# Patient Record
Sex: FEMALE | Race: WHITE | NOT HISPANIC OR LATINO | Employment: FULL TIME | URBAN - METROPOLITAN AREA
[De-identification: names, ages, dates, MRNs, and addresses within clinical notes are randomized per-mention and may not be internally consistent; named-entity substitution may affect disease eponyms.]

---

## 2017-10-11 ENCOUNTER — CLINICAL SUPPORT (OUTPATIENT)
Dept: OTHER | Facility: CLINIC | Age: 47
End: 2017-10-11
Payer: COMMERCIAL

## 2017-10-11 DIAGNOSIS — Z00.8 HEALTH EXAMINATION IN POPULATION SURVEYS: Primary | ICD-10-CM

## 2017-10-11 PROCEDURE — 82947 ASSAY GLUCOSE BLOOD QUANT: CPT | Mod: QW,S$GLB,, | Performed by: INTERNAL MEDICINE

## 2017-10-11 PROCEDURE — 80061 LIPID PANEL: CPT | Mod: QW,S$GLB,, | Performed by: INTERNAL MEDICINE

## 2017-10-11 PROCEDURE — 83036 HEMOGLOBIN GLYCOSYLATED A1C: CPT | Mod: QW,S$GLB,, | Performed by: INTERNAL MEDICINE

## 2017-10-11 PROCEDURE — 99401 PREV MED CNSL INDIV APPRX 15: CPT | Mod: S$GLB,,, | Performed by: INTERNAL MEDICINE

## 2017-10-12 VITALS
WEIGHT: 236 LBS | BODY MASS INDEX: 43.43 KG/M2 | DIASTOLIC BLOOD PRESSURE: 84 MMHG | SYSTOLIC BLOOD PRESSURE: 136 MMHG | HEIGHT: 62 IN

## 2017-10-12 LAB
GLUCOSE SERPL-MCNC: 139 MG/DL (ref 60–140)
HBA1C MFR BLD: 6.8 % (ref 0–5.7)
POC CHOLESTEROL, HDL: 44 MG/DL (ref 40–?)
POC CHOLESTEROL, LDL: 91 MG/DL (ref ?–160)
POC CHOLESTEROL, TOTAL: 182 MG/DL (ref ?–240)
POC GLUCOSE FASTING: ABNORMAL MG/DL (ref 60–110)
POC TOTAL CHOLESTEROL / HDL RATIO: 4.1 (ref ?–6)
POC TRIGLYCERIDES: 235 MG/DL (ref ?–160)

## 2018-10-29 ENCOUNTER — CLINICAL SUPPORT (OUTPATIENT)
Dept: OTHER | Facility: CLINIC | Age: 48
End: 2018-10-29
Payer: COMMERCIAL

## 2018-10-29 DIAGNOSIS — Z00.8 ENCOUNTER FOR OTHER GENERAL EXAMINATION: ICD-10-CM

## 2018-10-29 PROCEDURE — 80061 LIPID PANEL: CPT | Mod: QW,S$GLB,, | Performed by: INTERNAL MEDICINE

## 2018-10-29 PROCEDURE — 82947 ASSAY GLUCOSE BLOOD QUANT: CPT | Mod: QW,S$GLB,, | Performed by: INTERNAL MEDICINE

## 2018-10-29 PROCEDURE — 99401 PREV MED CNSL INDIV APPRX 15: CPT | Mod: S$GLB,,, | Performed by: INTERNAL MEDICINE

## 2018-10-30 VITALS — HEIGHT: 61 IN | BODY MASS INDEX: 44.59 KG/M2

## 2018-10-30 LAB
HBA1C MFR BLD: 7.1 %
HDLC SERPL-MCNC: 48 MG/DL
POC CHOLESTEROL, LDL (DOCK): 87 MG/DL
POC CHOLESTEROL, TOTAL: 166 MG/DL
POC GLUCOSE, FASTING: 139 MG/DL
TRIGL SERPL-MCNC: 154 MG/DL

## 2018-11-16 NOTE — PROGRESS NOTES
Results reviewed per DELMIS Parker RN at [facility]. BP med taken 12 hours ago. Manual reading 150/92. Pt states she is having pain today.

## 2019-10-30 ENCOUNTER — CLINICAL SUPPORT (OUTPATIENT)
Dept: OTHER | Facility: CLINIC | Age: 49
End: 2019-10-30
Payer: COMMERCIAL

## 2019-10-30 DIAGNOSIS — Z00.8 ENCOUNTER FOR OTHER GENERAL EXAMINATION: ICD-10-CM

## 2019-10-30 PROCEDURE — 80061 PR  LIPID PANEL: ICD-10-PCS | Mod: QW,S$GLB,, | Performed by: INTERNAL MEDICINE

## 2019-10-30 PROCEDURE — 99401 PREV MED CNSL INDIV APPRX 15: CPT | Mod: S$GLB,,, | Performed by: INTERNAL MEDICINE

## 2019-10-30 PROCEDURE — 99401 PR PREVENT COUNSEL,INDIV,15 MIN: ICD-10-PCS | Mod: S$GLB,,, | Performed by: INTERNAL MEDICINE

## 2019-10-30 PROCEDURE — 82947 ASSAY GLUCOSE BLOOD QUANT: CPT | Mod: QW,S$GLB,, | Performed by: INTERNAL MEDICINE

## 2019-10-30 PROCEDURE — 80061 LIPID PANEL: CPT | Mod: QW,S$GLB,, | Performed by: INTERNAL MEDICINE

## 2019-10-30 PROCEDURE — 82947 PR  ASSAY QUANTITATIVE,BLOOD GLUCOSE: ICD-10-PCS | Mod: QW,S$GLB,, | Performed by: INTERNAL MEDICINE

## 2019-10-31 VITALS — BODY MASS INDEX: 44.59 KG/M2 | HEIGHT: 61 IN

## 2019-10-31 LAB
GLUCOSE SERPL-MCNC: 146 MG/DL (ref 60–140)
HBA1C MFR BLD: 7.1 %
HDLC SERPL-MCNC: 38 MG/DL
POC CHOLESTEROL, LDL (DOCK): 101 MG/DL
POC CHOLESTEROL, TOTAL: 187 MG/DL
TRIGL SERPL-MCNC: 238 MG/DL

## 2020-11-04 ENCOUNTER — CLINICAL SUPPORT (OUTPATIENT)
Dept: OTHER | Facility: CLINIC | Age: 50
End: 2020-11-04
Payer: COMMERCIAL

## 2020-11-04 DIAGNOSIS — Z00.8 ENCOUNTER FOR OTHER GENERAL EXAMINATION: ICD-10-CM

## 2020-11-04 PROCEDURE — 80061 LIPID PANEL: CPT | Mod: QW,S$GLB,, | Performed by: INTERNAL MEDICINE

## 2020-11-04 PROCEDURE — 99401 PR PREVENT COUNSEL,INDIV,15 MIN: ICD-10-PCS | Mod: S$GLB,,, | Performed by: INTERNAL MEDICINE

## 2020-11-04 PROCEDURE — 80061 PR  LIPID PANEL: ICD-10-PCS | Mod: QW,S$GLB,, | Performed by: INTERNAL MEDICINE

## 2020-11-04 PROCEDURE — 82947 PR  ASSAY QUANTITATIVE,BLOOD GLUCOSE: ICD-10-PCS | Mod: QW,S$GLB,, | Performed by: INTERNAL MEDICINE

## 2020-11-04 PROCEDURE — 82947 ASSAY GLUCOSE BLOOD QUANT: CPT | Mod: QW,S$GLB,, | Performed by: INTERNAL MEDICINE

## 2020-11-04 PROCEDURE — 99401 PREV MED CNSL INDIV APPRX 15: CPT | Mod: S$GLB,,, | Performed by: INTERNAL MEDICINE

## 2020-11-05 VITALS — BODY MASS INDEX: 43.16 KG/M2 | HEIGHT: 62 IN

## 2020-11-05 LAB
HBA1C MFR BLD: 5.3 %
HDLC SERPL-MCNC: 69 MG/DL
POC CHOLESTEROL, LDL (DOCK): 102 MG/DL
POC CHOLESTEROL, TOTAL: 207 MG/DL
POC GLUCOSE, FASTING: 71 MG/DL (ref 60–110)
TRIGL SERPL-MCNC: 183 MG/DL

## 2021-01-14 ENCOUNTER — TELEPHONE (OUTPATIENT)
Dept: OTHER | Facility: CLINIC | Age: 51
End: 2021-01-14

## 2021-04-29 ENCOUNTER — PATIENT MESSAGE (OUTPATIENT)
Dept: RESEARCH | Facility: HOSPITAL | Age: 51
End: 2021-04-29

## 2021-10-21 ENCOUNTER — CLINICAL SUPPORT (OUTPATIENT)
Dept: OTHER | Facility: CLINIC | Age: 51
End: 2021-10-21
Payer: COMMERCIAL

## 2021-10-21 DIAGNOSIS — Z00.8 ENCOUNTER FOR OTHER GENERAL EXAMINATION: ICD-10-CM

## 2021-10-21 PROCEDURE — 80061 LIPID PANEL: CPT | Mod: QW,S$GLB,, | Performed by: INTERNAL MEDICINE

## 2021-10-21 PROCEDURE — 80061 PR  LIPID PANEL: ICD-10-PCS | Mod: QW,S$GLB,, | Performed by: INTERNAL MEDICINE

## 2021-10-21 PROCEDURE — 82947 PR  ASSAY QUANTITATIVE,BLOOD GLUCOSE: ICD-10-PCS | Mod: QW,S$GLB,, | Performed by: INTERNAL MEDICINE

## 2021-10-21 PROCEDURE — 99401 PR PREVENT COUNSEL,INDIV,15 MIN: ICD-10-PCS | Mod: S$GLB,,, | Performed by: INTERNAL MEDICINE

## 2021-10-21 PROCEDURE — 82947 ASSAY GLUCOSE BLOOD QUANT: CPT | Mod: QW,S$GLB,, | Performed by: INTERNAL MEDICINE

## 2021-10-21 PROCEDURE — 99401 PREV MED CNSL INDIV APPRX 15: CPT | Mod: S$GLB,,, | Performed by: INTERNAL MEDICINE

## 2021-10-22 VITALS — BODY MASS INDEX: 44.59 KG/M2 | HEIGHT: 61 IN

## 2021-10-22 LAB
HDLC SERPL-MCNC: 55 MG/DL
POC CHOLESTEROL, LDL (DOCK): 91 MG/DL
POC CHOLESTEROL, TOTAL: 171 MG/DL
POC GLUCOSE, FASTING: 84 MG/DL (ref 60–110)
TRIGL SERPL-MCNC: 129 MG/DL

## 2025-05-25 ENCOUNTER — HOSPITAL ENCOUNTER (EMERGENCY)
Facility: HOSPITAL | Age: 55
Discharge: HOME | End: 2025-05-25
Attending: EMERGENCY MEDICINE
Payer: COMMERCIAL

## 2025-05-25 VITALS
HEART RATE: 82 BPM | SYSTOLIC BLOOD PRESSURE: 133 MMHG | DIASTOLIC BLOOD PRESSURE: 91 MMHG | TEMPERATURE: 98 F | OXYGEN SATURATION: 100 % | BODY MASS INDEX: 28.56 KG/M2 | HEIGHT: 61 IN | WEIGHT: 151.25 LBS | RESPIRATION RATE: 19 BRPM

## 2025-05-25 DIAGNOSIS — K57.90 DIVERTICULOSIS: ICD-10-CM

## 2025-05-25 DIAGNOSIS — K59.00 CONSTIPATION, UNSPECIFIED CONSTIPATION TYPE: ICD-10-CM

## 2025-05-25 DIAGNOSIS — R11.2 NAUSEA AND VOMITING, UNSPECIFIED VOMITING TYPE: Primary | ICD-10-CM

## 2025-05-25 LAB
ABSOLUTE EOSINOPHIL (OHS): 0.2 K/UL
ABSOLUTE MONOCYTE (OHS): 0.44 K/UL (ref 0.3–1)
ABSOLUTE NEUTROPHIL COUNT (OHS): 4.89 K/UL (ref 1.8–7.7)
ALBUMIN SERPL BCP-MCNC: 4.1 G/DL (ref 3.5–5.2)
ALP SERPL-CCNC: 64 UNIT/L (ref 40–150)
ALT SERPL W/O P-5'-P-CCNC: 12 UNIT/L (ref 10–44)
ANION GAP (OHS): 14 MMOL/L (ref 8–16)
AST SERPL-CCNC: 14 UNIT/L (ref 11–45)
BASOPHILS # BLD AUTO: 0.06 K/UL
BASOPHILS NFR BLD AUTO: 0.7 %
BILIRUB SERPL-MCNC: 0.5 MG/DL (ref 0.1–1)
BILIRUB UR QL STRIP.AUTO: NEGATIVE
BUN SERPL-MCNC: 8 MG/DL (ref 6–20)
CALCIUM SERPL-MCNC: 9.9 MG/DL (ref 8.7–10.5)
CHLORIDE SERPL-SCNC: 103 MMOL/L (ref 95–110)
CLARITY UR: CLEAR
CO2 SERPL-SCNC: 23 MMOL/L (ref 23–29)
COLOR UR AUTO: YELLOW
CREAT SERPL-MCNC: 0.7 MG/DL (ref 0.5–1.4)
ERYTHROCYTE [DISTWIDTH] IN BLOOD BY AUTOMATED COUNT: 12.4 % (ref 11.5–14.5)
GFR SERPLBLD CREATININE-BSD FMLA CKD-EPI: >60 ML/MIN/1.73/M2
GLUCOSE SERPL-MCNC: 87 MG/DL (ref 70–110)
GLUCOSE UR QL STRIP: NEGATIVE
HCT VFR BLD AUTO: 42.6 % (ref 37–48.5)
HGB BLD-MCNC: 14.1 GM/DL (ref 12–16)
HGB UR QL STRIP: NEGATIVE
HOLD SPECIMEN: NORMAL
IMM GRANULOCYTES # BLD AUTO: 0.02 K/UL (ref 0–0.04)
IMM GRANULOCYTES NFR BLD AUTO: 0.2 % (ref 0–0.5)
KETONES UR QL STRIP: NEGATIVE
LEUKOCYTE ESTERASE UR QL STRIP: NEGATIVE
LIPASE SERPL-CCNC: 37 U/L (ref 4–60)
LYMPHOCYTES # BLD AUTO: 2.49 K/UL (ref 1–4.8)
MCH RBC QN AUTO: 29.3 PG (ref 27–31)
MCHC RBC AUTO-ENTMCNC: 33.1 G/DL (ref 32–36)
MCV RBC AUTO: 88 FL (ref 82–98)
NITRITE UR QL STRIP: NEGATIVE
NUCLEATED RBC (/100WBC) (OHS): 0 /100 WBC
PH UR STRIP: 8 [PH]
PLATELET # BLD AUTO: 255 K/UL (ref 150–450)
PMV BLD AUTO: 10.4 FL (ref 9.2–12.9)
POTASSIUM SERPL-SCNC: 4.1 MMOL/L (ref 3.5–5.1)
PROT SERPL-MCNC: 7.3 GM/DL (ref 6–8.4)
PROT UR QL STRIP: NEGATIVE
RBC # BLD AUTO: 4.82 M/UL (ref 4–5.4)
RELATIVE EOSINOPHIL (OHS): 2.5 %
RELATIVE LYMPHOCYTE (OHS): 30.7 % (ref 18–48)
RELATIVE MONOCYTE (OHS): 5.4 % (ref 4–15)
RELATIVE NEUTROPHIL (OHS): 60.5 % (ref 38–73)
SODIUM SERPL-SCNC: 140 MMOL/L (ref 136–145)
SP GR UR STRIP: 1.01
UROBILINOGEN UR STRIP-ACNC: NEGATIVE EU/DL
WBC # BLD AUTO: 8.1 K/UL (ref 3.9–12.7)

## 2025-05-25 PROCEDURE — 80053 COMPREHEN METABOLIC PANEL: CPT | Mod: ER

## 2025-05-25 PROCEDURE — 81003 URINALYSIS AUTO W/O SCOPE: CPT | Mod: ER

## 2025-05-25 PROCEDURE — 96374 THER/PROPH/DIAG INJ IV PUSH: CPT | Mod: ER

## 2025-05-25 PROCEDURE — 99285 EMERGENCY DEPT VISIT HI MDM: CPT | Mod: 25,ER

## 2025-05-25 PROCEDURE — 83690 ASSAY OF LIPASE: CPT | Mod: ER

## 2025-05-25 PROCEDURE — 85025 COMPLETE CBC W/AUTO DIFF WBC: CPT | Mod: ER

## 2025-05-25 PROCEDURE — 63600175 PHARM REV CODE 636 W HCPCS: Mod: ER

## 2025-05-25 RX ORDER — METOCLOPRAMIDE HYDROCHLORIDE 5 MG/ML
10 INJECTION INTRAMUSCULAR; INTRAVENOUS
Status: COMPLETED | OUTPATIENT
Start: 2025-05-25 | End: 2025-05-25

## 2025-05-25 RX ORDER — ONDANSETRON 4 MG/1
4 TABLET, FILM COATED ORAL EVERY 6 HOURS PRN
Qty: 12 TABLET | Refills: 0 | Status: SHIPPED | OUTPATIENT
Start: 2025-05-25 | End: 2025-05-29

## 2025-05-25 RX ADMIN — METOCLOPRAMIDE 10 MG: 5 INJECTION, SOLUTION INTRAMUSCULAR; INTRAVENOUS at 12:05

## 2025-05-25 NOTE — ED PROVIDER NOTES
ED Provider Note - 5/25/2025    History     Chief Complaint   Patient presents with    Abdominal Pain     That began on Tuesday. +N/V/D     Patient with a previous medical history of asthma, diabetes, endometriosis, hypercholesterolemia, thyroid nodule presents to emergency department with complains of abdominal pain. The pain is described as cramping and pressure-like, and is 4/10 in intensity. Pain is located in the epigastric, diffusely without radiation. Onset was 2 days ago. Symptoms have been unchanged since. Aggravating factors: alcohol, eating, and spicy foods.  Alleviating factors: none. Associated symptoms: constipation, nausea, and vomiting. The patient denies diarrhea, fever, hematuria, melena, and vaginal discharge.  Patient reports her last bowel movement was today.  Patient reports no recent medication changes and that she has been taking majoro for 2 years.            Review of patient's allergies indicates:   Allergen Reactions    Codeine Hives     halluciations    Lortab [hydrocodone-acetaminophen]      Past Medical History:   Diagnosis Date    Asthma     Diabetes     Endometriosis     Hypercholesterolemia     Thyroid nodule      Past Surgical History:   Procedure Laterality Date    COLONOSCOPY  12/21/2023    HYSTEROSCOPY      LAPAROSCOPIC CHOLECYSTECTOMY  09/30/2020    RIGHT OOPHORECTOMY      SLEEVE GASTROPLASTY      TONSILLECTOMY      TUBAL LIGATION      WISDOM TOOTH EXTRACTION       Family History   Problem Relation Name Age of Onset    Lung cancer Mother          mets to breast and bone    Thyroid cancer Daughter      Depression Maternal Grandmother      Diabetes Maternal Grandmother      Heart disease Paternal Grandmother      Heart disease Paternal Grandfather       Social History[1]     Review of Systems   Constitutional:  Negative for chills and fever.   HENT:  Negative for sore throat.    Respiratory:  Negative for shortness of breath.    Cardiovascular:  Negative for chest pain.  "  Gastrointestinal:  Positive for abdominal pain, constipation, nausea and vomiting. Negative for diarrhea.   Genitourinary:  Negative for dysuria, flank pain, pelvic pain, vaginal bleeding and vaginal discharge.   Musculoskeletal:  Negative for back pain, neck pain and neck stiffness.   Skin:  Negative for rash.   Neurological:  Negative for dizziness, weakness, light-headedness and numbness.   Hematological:  Does not bruise/bleed easily.     The patient's list of active medical problems, social history, medications, and allergies as documented per RN staff has been reviewed.     Physical Exam     Vitals:    05/25/25 1108   BP: (!) 133/91   Pulse: 82   Resp: 19   Temp: 98.1 °F (36.7 °C)   TempSrc: Oral   SpO2: 100%   Weight: 68.6 kg (151 lb 3.8 oz)   Height: 5' 1" (1.549 m)     Physical Exam    Constitutional: She appears well-developed. She is cooperative. No distress.   HENT:   Head: Normocephalic and atraumatic.   Eyes: EOM are normal. Pupils are equal, round, and reactive to light.   Neck: Neck supple.   Normal range of motion.  Cardiovascular:  Normal rate, regular rhythm, normal heart sounds and intact distal pulses.           Pulmonary/Chest: Breath sounds normal. No respiratory distress.   Abdominal: Abdomen is soft. Bowel sounds are normal. She exhibits no distension. There is no abdominal tenderness.   No right CVA tenderness.  No left CVA tenderness. There is no rebound and no guarding.   Musculoskeletal:         General: No edema. Normal range of motion.      Cervical back: Normal range of motion and neck supple.     Neurological: She is alert and oriented to person, place, and time. GCS score is 15. GCS eye subscore is 4. GCS verbal subscore is 5. GCS motor subscore is 6.   Skin: Skin is warm. Capillary refill takes less than 2 seconds.   Psychiatric: She has a normal mood and affect.       ED Procedures   Procedures    MDM  Differential Diagnoses   Based on available history, the working differential " diagnoses considered during this evaluation include but are not limited to viral gastroenteritis, food poisoning, intra-abdominal infection, enteritis/colitis, constipation, impaction, ileus, obstruction, pancreatitis, PUD, gastritis, GERD.      LABS     Labs Reviewed   COMPREHENSIVE METABOLIC PANEL - Normal       Result Value    Sodium 140      Potassium 4.1      Chloride 103      CO2 23      Glucose 87      BUN 8      Creatinine 0.7      Calcium 9.9      Protein Total 7.3      Albumin 4.1      Bilirubin Total 0.5      ALP 64      AST 14      ALT 12      Anion Gap 14      eGFR >60     LIPASE - Normal    Lipase Level 37     URINALYSIS, REFLEX TO URINE CULTURE - Normal    Color, UA Yellow      Appearance, UA Clear      pH, UA 8.0      Spec Grav UA 1.015      Protein, UA Negative      Glucose, UA Negative      Ketones, UA Negative      Bilirubin, UA Negative      Blood, UA Negative      Nitrites, UA Negative      Urobilinogen, UA Negative      Leukocyte Esterase, UA Negative     CBC WITH DIFFERENTIAL - Normal    WBC 8.10      RBC 4.82      HGB 14.1      HCT 42.6      MCV 88      MCH 29.3      MCHC 33.1      RDW 12.4      Platelet Count 255      MPV 10.4      Nucleated RBC 0      Neut % 60.5      Lymph % 30.7      Mono % 5.4      Eos % 2.5      Basophil % 0.7      Imm Grans % 0.2      Neut # 4.89      Lymph # 2.49      Mono # 0.44      Eos # 0.20      Baso # 0.06      Imm Grans # 0.02     CBC W/ AUTO DIFFERENTIAL    Narrative:     The following orders were created for panel order CBC W/ AUTO DIFFERENTIAL.  Procedure                               Abnormality         Status                     ---------                               -----------         ------                     CBC with Differential[[id number]]       Normal              Final result                 Please view results for these tests on the individual orders.   GREY TOP URINE HOLD    Extra Tube Hold for add-ons.             Notable findings from my  independent interpretations of the labs (if ordered) include:  Lipase unremarkable, CBC unremarkable, Chemistry unremarkable, Urinalysis urinalysis unremarkable         Imaging     Imaging Results              CT Abdomen Pelvis  Without Contrast (Final result)  Result time 05/25/25 12:53:29      Final result by Deniz Lugo MD (05/25/25 12:53:29)                   Impression:      1.  There is no CT evidence of an acute intra-abdominal inflammatory process.  2.  Cholecystectomy with suspected reservoir effect with prominent dilatation common bile duct, 15 mm, with tapering at the ampulla.  No intrahepatic ductal dilatation.  Normal pancreatic duct.  3.  Status post gastric banding without complication.  4.  Mild sigmoid diverticulosis.          All CT scans at [this location] are performed using dose modulation techniques as appropriate to a performed exam including the following:  Automated exposure control; adjustment of the mA and/or kV according to patient size (this includes techniques or standardized protocols for targeted exams where dose is matched to indication / reason for exam; i.e. extremities or head); use of iterative reconstruction technique.    Finalized on: 5/25/2025 12:53 PM By:  Deniz Lugo MD  [facility]# [id number]      2025-05-25 12:55:34.424     [facility]               Narrative:    EXAM:  CT ABDOMEN PELVIS WITHOUT CONTRAST    CLINICAL INDICATION: Nausea and vomiting.  Generalized abdominal pain.    TECHNIQUE: Abdominal and pelvic CT without contrast. Coronal and sagittal reformations.    COMPARISON STUDY:  None.    FINDINGS:    Abdominal CT.  Lung bases are clear.    Liver, spleen, pancreas, adrenal glands, and kidneys are normal.    Normal aorta.  No lymphadenopathy.    Cholecystectomy.  Common bile duct is dilated, up to 15 mm, with tapering towards the ampulla without intrahepatic ductal dilatation, likely reservoir effect.  No pancreatic ductal dilatation.    No bowel obstruction.  Normal  appendix.  Mild sigmoid diverticulosis without diverticulitis.  Status post gastric banding, well-positioned.  No hiatal hernia.  The GI tract is otherwise unremarkable.    There is no free air or free fluid.    Multilevel degenerative lower lumbar spine.    Pelvic CT.  The pelvic ring is intact.  Pelvic bowel loops are otherwise unremarkable.  Ureters and bladder are normal.  Pelvic phleboliths.  Normal size uterus and ovaries.  No pelvic mass, free fluid, or lymphadenopathy.                                                         EKG          ED Management/Discussion     Medications   metoclopramide injection 10 mg (10 mg Intravenous Given 5/25/25 1218)                   All findings have been reviewed with the patient/family and assessment at this time does not suggest toxicity or an acute abdomen.  Patient has been cautioned that an unremarkable exam does not completely exclude the possibility of an early/developing process, and any change or progression of symptoms should prompt immediate return to the department for further investigation.         Patient appears adequately hydrated at this time but was advised to maintain generous hydration and gradually advance bland food intake with the use of antiemetics.  necessary.  An ambulatory referral to GI was provided to patient for appropriate follow-up.                               On final assessment, the patient appears suitable for discharge.  I see no indication of an emergent process beyond that addressed during our encounter but have duly counseled the patient/family regarding the need for prompt follow-up as well as the indications that should prompt immediate return to the emergency room.  The patient/family has been provided with language -specific verbal and printed direction regarding our final diagnosis(es) as well as instructions regarding use of OTC and/or Rx medications intended to manage the patient's aforementioned conditions including:  ED  Prescriptions       Medication Sig Dispense Start Date End Date Auth. Provider    ondansetron (ZOFRAN) 4 MG tablet Take 1 tablet (4 mg total) by mouth every 6 (six) hours as needed for Nausea. 12 tablet 5/25/2025 5/29/2025 Irma Kumari NP              Patient has been advised of the following recommended follow-up instructions:  Follow-up Information       Follow up With Specialties Details Why Contact Info    [facility] Emergency Dept Emergency Medicine  If symptoms worsen [address]  Emergency Department  [facility] [id number] [phone]    Brooklyn Torrez MD Internal Medicine   [address] [place] [id number] [phone]            The patient/family communicates understanding of all this information and all remaining questions and concerns were addressed at this time.      Referrals:  Orders Placed This Encounter   Procedures    Ambulatory referral/consult to Gastroenterology     Standing Status:   Future     Expected Date:   6/1/2025     Expiration Date:   6/25/2026     Referral Priority:   Routine     Referral Type:   Consultation     Referral Reason:   Specialty Services Required     Requested Specialty:   Gastroenterology     Number of Visits Requested:   1             CLINICAL IMPRESSION    ICD-10-CM ICD-9-CM   1. Nausea and vomiting, unspecified vomiting type  R11.2 787.01   2. Constipation, unspecified constipation type  K59.00 564.00   3. Diverticulosis  K57.90 562.10        ED Disposition Condition    Discharge Stable            Social Drivers of Health     Tobacco Use: Low Risk  (5/25/2025)    Patient History     Smoking Tobacco Use: Never     Smokeless Tobacco Use: Never     Passive Exposure: Not on file   Alcohol Use: Not At Risk (5/12/2025)    [facility] Aff[facility]    AUDIT-C     Frequency of Alcohol Consumption: 2-4 times a month     Average Number of Drinks: 1 or 2     Frequency of Binge  Drinking: Never   Financial Resource Strain: Medium Risk (5/12/2025)    Received from [facility] and [facility]iliates    Overall Financial Resource Strain (CARDIA)     Difficulty of Paying Living Expenses: Somewhat hard   Food Insecurity: Food Insecurity Present (5/12/2025)    Received from [facility]    Hunger Vital Sign     Worried About Running Out of Food in the Last Year: Sometimes true     Ran Out of Food in the Last Year: Sometimes true   Transportation Needs: Unmet Transportation Needs (5/12/2025)    Received from [facility]    PRAPARE - Transportation     Lack of Transportation (Medical): No     Lack of Transportation (Non-Medical): Yes   Physical Activity: Sufficiently Active (5/12/2025)    Received from [facility]    Exercise Vital Sign     Days of Exercise per Week: 4 days     Minutes of Exercise per Session: 50 min   Stress: Stress Concern Present (5/12/2025)    Received from [facility]    [employer] - Occupational Stress Questionnaire     Feeling of Stress : Very much   Housing Stability: Low Risk  (5/12/2025)    [facility]    Housing Stability Vital Sign     Unable to Pay for Housing in the Last Year: No     Number of Times Moved in the Last Year: 0     Homeless in the Last Year: No   Depression: Not at risk (10/16/2023)    [facility]    PHQ-2     PHQ-2 Score: 0   Utilities: Not At Risk (5/12/2025)    Received from [facility]  [facility] and Its Subsidiaries and Affiliates    [facility] Utilities     Threatened with loss of utilities: No   Health Literacy: Adequate Health Literacy (5/12/2025)    [facility] and Its Subsidiaries and Affiliates     Health Literacy     Frequency of need for help with medical instructions: Rarely   Social Isolation: Not on file             [1]   Social History  Tobacco Use    Smoking status: Never    Smokeless tobacco: Never        Irma Kumari NP  05/25/25 2018